# Patient Record
Sex: MALE | Race: BLACK OR AFRICAN AMERICAN | NOT HISPANIC OR LATINO | Employment: FULL TIME | URBAN - METROPOLITAN AREA
[De-identification: names, ages, dates, MRNs, and addresses within clinical notes are randomized per-mention and may not be internally consistent; named-entity substitution may affect disease eponyms.]

---

## 2024-02-21 VITALS
WEIGHT: 147 LBS | SYSTOLIC BLOOD PRESSURE: 138 MMHG | BODY MASS INDEX: 21.05 KG/M2 | DIASTOLIC BLOOD PRESSURE: 89 MMHG | TEMPERATURE: 99.2 F | RESPIRATION RATE: 16 BRPM | HEIGHT: 70 IN | OXYGEN SATURATION: 99 % | HEART RATE: 89 BPM

## 2024-02-21 PROCEDURE — 99284 EMERGENCY DEPT VISIT MOD MDM: CPT | Mod: 25

## 2024-02-21 ASSESSMENT — COLUMBIA-SUICIDE SEVERITY RATING SCALE - C-SSRS
2. HAVE YOU ACTUALLY HAD ANY THOUGHTS OF KILLING YOURSELF IN THE PAST MONTH?: NO
6. HAVE YOU EVER DONE ANYTHING, STARTED TO DO ANYTHING, OR PREPARED TO DO ANYTHING TO END YOUR LIFE?: NO
1. IN THE PAST MONTH, HAVE YOU WISHED YOU WERE DEAD OR WISHED YOU COULD GO TO SLEEP AND NOT WAKE UP?: NO

## 2024-02-22 ENCOUNTER — APPOINTMENT (OUTPATIENT)
Dept: CT IMAGING | Facility: CLINIC | Age: 24
End: 2024-02-22
Attending: EMERGENCY MEDICINE
Payer: OTHER MISCELLANEOUS

## 2024-02-22 ENCOUNTER — HOSPITAL ENCOUNTER (EMERGENCY)
Facility: CLINIC | Age: 24
Discharge: HOME | End: 2024-02-22
Attending: EMERGENCY MEDICINE | Admitting: EMERGENCY MEDICINE
Payer: OTHER MISCELLANEOUS

## 2024-02-22 DIAGNOSIS — S09.90XA CLOSED HEAD INJURY, INITIAL ENCOUNTER: ICD-10-CM

## 2024-02-22 PROCEDURE — 250N000013 HC RX MED GY IP 250 OP 250 PS 637: Performed by: EMERGENCY MEDICINE

## 2024-02-22 PROCEDURE — 72125 CT NECK SPINE W/O DYE: CPT

## 2024-02-22 PROCEDURE — 70450 CT HEAD/BRAIN W/O DYE: CPT

## 2024-02-22 RX ORDER — ACETAMINOPHEN 500 MG
1000 TABLET ORAL ONCE
Status: COMPLETED | OUTPATIENT
Start: 2024-02-22 | End: 2024-02-22

## 2024-02-22 RX ADMIN — ACETAMINOPHEN 1000 MG: 500 TABLET ORAL at 02:16

## 2024-02-22 ASSESSMENT — ACTIVITIES OF DAILY LIVING (ADL)
ADLS_ACUITY_SCORE: 33
ADLS_ACUITY_SCORE: 35

## 2024-02-22 NOTE — ED TRIAGE NOTES
Pt. Presents to ED with complaints of a 25 lb flap on a trailer falling down and hitting the R side of his head tonight around 1940. Pt. Denies LOC. Denies blood thinners. Denies medical conditions, prescribed meds, or bleeding disorders. AVSS on RA. A & O x 4, independent. Pt. Reports R sided numbness on his head and some dizziness. Denies vomiting.

## 2024-02-22 NOTE — Clinical Note
Brittayn Vega was seen and treated in our emergency department on 2/21/2024.  He may return to work on 02/26/2024.       If you have any questions or concerns, please don't hesitate to call.      Mckenna Mendes MD

## 2024-02-22 NOTE — ED PROVIDER NOTES
"  History     Chief Complaint:  Head Injury       HPI   Brittany Vega is a 23 year old male who presents with head injury at work after a 25 lb package fell onto the right side of his head. Patient was unloading a truck when a package fell out and hit his head. He felt dizziness after this but denies losing consciousness as well as use of thinner or bleeding disorder history. He has not had nausea, vomiting, vision change. Here he note numbness on the right side of his head.  He has had mild frontal headache as well as dizziness.  He denies other symptoms.    Independent Historian:   None - Patient Only brother interprets      Review of External Notes:   none      Medications:    The patient is not currently taking any prescribed medications.     Past Medical History:    The patient denies any significant past medical history.     Physical Exam   Patient Vitals for the past 24 hrs:   BP Temp Temp src Pulse Resp SpO2 Height Weight   02/21/24 2240 138/89 99.2  F (37.3  C) Temporal 89 16 99 % 1.778 m (5' 10\") 66.7 kg (147 lb)        Physical Exam  General: alert, lying comfortably on gurney  Head: Mild tenderness to the right parietal scalp, no laceration, crepitus, or bruising.  HENT: mucous membranes moist, no hemotympanum on the right, left TM obstructed by cerumen.  No Wheeler sign or  CV: regular rate, regular rhythm  Resp: normal effort, clear throughout, no crackles or wheezing  GI: abdomen soft and nontender, no guarding  MSK: no bony tenderness to the midline cervical spine.  Skin: appropriately warm and dry  Extremities: no edema, calves non-tender  Neuro: GCS 15.     Speech is fluent, cognition is normal.     EOMI, symmetric grimace.     Str 5/5 in RUE, LUE, RLE, LLE.     Fine touch sensation intact in BUE/BLE.  Psych: normal mood and affect      Emergency Department Course     Imaging:  CT Cervical Spine w/o Contrast   Final Result   IMPRESSION:   1.  No fracture or posttraumatic subluxation.   2.  No " high-grade spinal canal or neural foraminal stenosis.      CT Head w/o Contrast   Final Result   IMPRESSION:   1.  No acute intracranial process.         Emergency Department Course & Assessments:  Interventions:  Medications   acetaminophen (TYLENOL) tablet 1,000 mg (1,000 mg Oral $Given 2/22/24 0216)        Assessments:  0112 I obtained history and examined the patient as noted above.     Independent Interpretation (X-rays, CTs, rhythm strip):  I viewed the patient's head CT.  No intracranial hemorrhage seen.    Consultations/Discussion of Management or Tests:    ED Course as of 02/22/24 0216   u Feb 22, 2024   0133 I independently interpreted head CT. No obvious intracranial hemorrhage.        Social Determinants of Health affecting care:   Patient works at [employer].    Disposition:  The patient was discharged.     Impression & Plan    Medical Decision Making:  Brittany Vega is a 23 year old male with negative past medical history, presenting today with head injury.  Injury occurred at work at [employer].  On exam, the patient is neurologically intact.  He has normal vitals.  He has a nonfocal neurological exam.  No signs of basilar skull fracture.  He has mild tenderness to the right side of his scalp, but no obvious laceration or fracture.  CT imaging of the head and cervical spine are negative for hemorrhage, fracture, or evidence of ligamentous injury.  Patient was given a dose of Tylenol in the ED.  We discussed return precautions with friend/family member at the bedside.  I recommended rest, supportive care for the next 5 days.  Patient does not have to be back at work until 2/26.  Otherwise, return to the ED for severe headache, vomiting, altered mental status, or for other concerns.    Diagnosis:    ICD-10-CM    1. Closed head injury, initial encounter  S09.90XA              Scribe Disclosure:  Dilip SCHWARTZ, am serving as a scribe at 1:26 AM on 2/22/2024 to document services personally performed by  Mckenna Mendes MD based on my observations and the provider's statements to me.   2/22/2024   Mckenna Mendes MD Pepper, Tracy Lynn, MD  02/22/24 0229

## 2024-02-22 NOTE — Clinical Note
Brittany Vega was seen and treated in our emergency department on 2/21/2024.  He may return to work on 02/26/2024.       If you have any questions or concerns, please don't hesitate to call.      Mckenna Mendes MD